# Patient Record
Sex: FEMALE | URBAN - METROPOLITAN AREA
[De-identification: names, ages, dates, MRNs, and addresses within clinical notes are randomized per-mention and may not be internally consistent; named-entity substitution may affect disease eponyms.]

---

## 2023-02-20 LAB
ALANINE AMINOTRANSFERASE (SGPT) (U/L) IN SER/PLAS: 34 U/L (ref 7–45)
ALBUMIN (G/DL) IN SER/PLAS: 4 G/DL (ref 3.4–5)
ALKALINE PHOSPHATASE (U/L) IN SER/PLAS: 43 U/L (ref 33–110)
ASPARTATE AMINOTRANSFERASE (SGOT) (U/L) IN SER/PLAS: 26 U/L (ref 9–39)
BILIRUBIN DIRECT (MG/DL) IN SER/PLAS: 0.1 MG/DL (ref 0–0.3)
BILIRUBIN TOTAL (MG/DL) IN SER/PLAS: 0.7 MG/DL (ref 0–1.2)
CHORIOGONADOTROPIN (MIU/ML) IN SER/PLAS: <2 MIU/ML
PROTEIN TOTAL: 7.5 G/DL (ref 6.4–8.2)
TRIGLYCERIDE (MG/DL) IN SER/PLAS: 140 MG/DL (ref 0–149)

## 2023-03-28 LAB — HCG, URINE: NEGATIVE

## 2023-04-25 LAB — HCG, URINE: NEGATIVE

## 2023-05-23 LAB — HCG, URINE: NEGATIVE

## 2023-06-22 LAB — HCG, URINE: NEGATIVE

## 2023-07-28 LAB — HCG, URINE: NEGATIVE

## 2024-06-11 ENCOUNTER — TELEPHONE (OUTPATIENT)
Dept: DERMATOLOGY | Facility: CLINIC | Age: 24
End: 2024-06-11

## 2024-06-11 NOTE — TELEPHONE ENCOUNTER
Patient called left message stating that she had put in a request for an appointment and had not heard back.  Patient states she is getting  next May and her acne is back and she would like to go back on Accutane.  Patient does need an appointment to be seen for further discussion with the provider.  Please contact patient and offer next available in person or virtual appointment whichever works best.

## 2024-06-27 ENCOUNTER — APPOINTMENT (OUTPATIENT)
Dept: DERMATOLOGY | Facility: CLINIC | Age: 24
End: 2024-06-27

## 2024-06-27 DIAGNOSIS — L70.0 ACNE VULGARIS: Primary | ICD-10-CM

## 2024-06-27 PROCEDURE — 99213 OFFICE O/P EST LOW 20 MIN: CPT | Performed by: NURSE PRACTITIONER

## 2024-06-27 RX ORDER — HYDROQUINONE 40 MG/G
CREAM TOPICAL 2 TIMES DAILY
Qty: 28.35 G | Refills: 0 | Status: SHIPPED | OUTPATIENT
Start: 2024-06-27 | End: 2025-06-27

## 2024-06-27 RX ORDER — ADAPALENE GEL USP, 0.3% 3 MG/G
GEL TOPICAL NIGHTLY
Qty: 45 G | Refills: 1 | Status: SHIPPED | OUTPATIENT
Start: 2024-06-27 | End: 2025-06-27

## 2024-06-27 NOTE — PROGRESS NOTES
Subjective     Kate Ruffin is a 23 y.o. female who presents for the following: Acne.   Established patient in today for acne follow-up patient had called and asked for a sooner appointment in regards to acne patient states she is getting  next May and was having acne flares she wanted to get under control.  Patient in today states that she is using gentle wash and a gentle moisturizer.  Asked patient if she is using any previously prescribed prescriptions patient states she uses tretinoin and spot treating if she has a breakout.    Patient has tried in the past: Differin gel, hydrating serums, oral antibiotic, clindamycin lotion, tretinoin, [place]ane [place], patient was on Accutane in 07/2023- cumulative dose: 12,000mg, benzyl peroxide wash      Review of Systems:  No other skin or systemic complaints other than what is documented elsewhere in the note.    The following portions of the chart were reviewed this encounter and updated as appropriate:  Allergies       Skin Cancer History  No skin cancer on file.    Specialty Problems    None    Past Medical History:  Kate Ruffin  has no past medical history on file.    Past Surgical History:  Kate Ruffin  has no past surgical history on file.    Family History:  Patient family history is not on file.    Social History:  Kate Ruffin  has no history on file for tobacco use, alcohol use, and drug use.    Allergies:  Patient has no known allergies.    Current Medications / CAM's:    Current Outpatient Medications:     adapalene (Differin) 0.3 % gel, Apply topically once daily at bedtime., Disp: 45 g, Rfl: 1    hydroquinone 4 % cream, Apply topically 2 times a day. For up to 3 months as needed until desired results achieved., Disp: 28.35 g, Rfl: 0     Objective   Well appearing patient in no apparent distress; mood and affect are within normal limits.      Assessment/Plan   1. Acne vulgaris  Head - Anterior (Face)  Clear on exam.  Patient has no evidence  active disease, but PIH on exam.     Maintenance of Current Regimen: Please continue using the prescribed topical medications as directed. Consistency is key to maintaining the improvement achieved so far.    PLAN:  Start adpalene 0.3%  Patient has been noncompliant with treatment in the past and has not been regularly using her previously prescribed tretinoin       Further Treatment Options: If there are no significant improvements or if your acne worsens, we may consider additional treatment options such as oral medications or in-office procedures. However, it is important to note that most cases of mild acne can be managed effectively with the current regimen.    Skin Care Tips: Continue following a gentle cleansing routine, avoiding harsh scrubbing, and using non-comedogenic moisturizers to keep your skin hydrated without clogging pores.    Cleansing Routine: Gentle Cleanser: You have been using a mild, non-comedogenic cleanser to wash your face twice daily. This practice helps to remove excess oil, dirt, and impurities from your skin, minimizing the risk of pore blockage.    Lifestyle Measures: Avoiding Trigger Factors: We also discussed avoiding known triggers such as excessive sun exposure, touching or picking at the acne lesions, and using heavy or comedogenic cosmetic products.    Please feel free to contact our clinic if you have any questions or concerns between appointments. Remember, acne treatment requires patience and consistency.                 hydroquinone 4 % cream - Head - Anterior (Face)  Apply topically 2 times a day. For up to 3 months as needed until desired results achieved.    adapalene (Differin) 0.3 % gel - Head - Anterior (Face)  Apply topically once daily at bedtime.

## 2024-10-03 ENCOUNTER — APPOINTMENT (OUTPATIENT)
Dept: DERMATOLOGY | Facility: CLINIC | Age: 24
End: 2024-10-03

## 2024-10-03 DIAGNOSIS — L70.0 ACNE VULGARIS: Primary | ICD-10-CM

## 2024-10-03 PROCEDURE — 99213 OFFICE O/P EST LOW 20 MIN: CPT | Performed by: NURSE PRACTITIONER

## 2024-10-03 NOTE — PROGRESS NOTES
Subjective     Kate Ruffin is a 24 y.o. female who presents for the following: Acne.   Established patient in for acne follow up last seen and prescribed adapalene 0.3%.     Patient states she has been travelling the last two weeks and has not been using the adapalene. She states that she is using Panoxyl wash occasionally, hyaluronic acid, salicylic acid wash and moisturizers.    Review of Systems:  No other skin or systemic complaints other than what is documented elsewhere in the note.    The following portions of the chart were reviewed this encounter and updated as appropriate:       Skin Cancer History  No skin cancer on file.    Specialty Problems    None    Past Medical History:  Kate Ruffin  has no past medical history on file.    Past Surgical History:  Kate Ruffin  has no past surgical history on file.    Family History:  Patient family history is not on file.    Social History:  Kate Ruffin  has no history on file for tobacco use, alcohol use, and drug use.    Allergies:  Patient has no known allergies.    Current Medications / CAM's:    Current Outpatient Medications:     adapalene (Differin) 0.3 % gel, Apply topically once daily at bedtime., Disp: 45 g, Rfl: 1    hydroquinone 4 % cream, Apply topically 2 times a day. For up to 3 months as needed until desired results achieved., Disp: 28.35 g, Rfl: 0     Objective   Well appearing patient in no apparent distress; mood and affect are within normal limits.      Assessment/Plan   1. Acne vulgaris  Head - Anterior (Face)  Clear on exam.  Patient has no evidence active disease, but PIH on exam.     PLAN:  Start adpalene 0.3%  Patient has been noncompliant with treatment in the past and has not been regularly using her previously prescribed tretinoin   Can use hydroquinone 4% cream twice daily for 2-3 months     Related Medications  hydroquinone 4 % cream  Apply topically 2 times a day. For up to 3 months as needed until desired results achieved.    adapalene  (Differin) 0.3 % gel  Apply topically once daily at bedtime.

## 2024-10-26 ENCOUNTER — OFFICE VISIT (OUTPATIENT)
Dept: URGENT CARE | Age: 24
End: 2024-10-26

## 2024-10-26 VITALS
WEIGHT: 135 LBS | OXYGEN SATURATION: 98 % | HEIGHT: 66 IN | DIASTOLIC BLOOD PRESSURE: 87 MMHG | TEMPERATURE: 98.4 F | SYSTOLIC BLOOD PRESSURE: 141 MMHG | RESPIRATION RATE: 16 BRPM | BODY MASS INDEX: 21.69 KG/M2 | HEART RATE: 92 BPM

## 2024-10-26 DIAGNOSIS — M54.50 ACUTE LEFT-SIDED LOW BACK PAIN WITHOUT SCIATICA: Primary | ICD-10-CM

## 2024-10-26 LAB
POC APPEARANCE, URINE: ABNORMAL
POC BILIRUBIN, URINE: NEGATIVE
POC BLOOD, URINE: NEGATIVE
POC COLOR, URINE: YELLOW
POC GLUCOSE, URINE: NEGATIVE MG/DL
POC KETONES, URINE: NEGATIVE MG/DL
POC LEUKOCYTES, URINE: NEGATIVE
POC NITRITE,URINE: NEGATIVE
POC PH, URINE: 6 PH
POC PROTEIN, URINE: NEGATIVE MG/DL
POC SPECIFIC GRAVITY, URINE: 1.01
POC UROBILINOGEN, URINE: 0.2 EU/DL

## 2024-10-26 PROCEDURE — 87086 URINE CULTURE/COLONY COUNT: CPT

## 2024-10-26 RX ORDER — NAPROXEN 500 MG/1
500 TABLET ORAL 2 TIMES DAILY PRN
Qty: 14 TABLET | Refills: 0 | Status: SHIPPED | OUTPATIENT
Start: 2024-10-26 | End: 2024-11-02

## 2024-10-26 NOTE — PROGRESS NOTES
"Subjective   Patient ID: Kate Ruffin is a 24 y.o. female. They present today with a chief complaint of Back Pain and UTI (Going on x1 day. ).    History of Present Illness  Patient reports symptoms have been present for ~1 day  Patient reports pain is worse with palpation and movement  Wonders if she hurt her back while bending over yesterday  Denies dysuria, increased urinary freq, urgency, hematuria, fever, chills, nausea/vomiting  Denies pelvic pain  Denies vaginal d/c or pruritis   Reports hx of UTI when she was a child which required hospitalization  Patient believes that she drinks a decent amount of water      Past Medical History  Allergies as of 10/26/2024    (No Known Allergies)       (Not in a hospital admission)       No past medical history on file.    No past surgical history on file.     reports that she has never smoked. She has never used smokeless tobacco.                               Objective    Vitals:    10/26/24 1908   BP: 141/87   BP Location: Right arm   Patient Position: Sitting   Pulse: 92   Resp: 16   Temp: 36.9 °C (98.4 °F)   TempSrc: Oral   SpO2: 98%   Weight: 61.2 kg (135 lb)   Height: 1.676 m (5' 6\")     Patient's last menstrual period was 10/22/2024 (approximate).    Physical Exam  Constitutional:       General: She is not in acute distress.     Appearance: Normal appearance. She is not toxic-appearing or diaphoretic.   HENT:      Nose: No rhinorrhea.   Eyes:      General: No scleral icterus.        Right eye: No discharge.         Left eye: No discharge.      Extraocular Movements: Extraocular movements intact.   Pulmonary:      Effort: Pulmonary effort is normal.   Abdominal:      Tenderness: There is no right CVA tenderness or left CVA tenderness.   Musculoskeletal:      Cervical back: Normal range of motion.      Lumbar back: Tenderness present. Normal range of motion.      Comments: Left lower back  Pain with flexion of back   Neurological:      Mental Status: She is alert. "   Psychiatric:         Mood and Affect: Mood normal.         Behavior: Behavior normal.         Thought Content: Thought content normal.      Comments: Pleasant         Procedures    Point of Care Test & Imaging Results from this visit:  Results for orders placed or performed in visit on 10/26/24   POCT UA Automated manually resulted    Collection Time: 10/26/24  7:15 PM   Result Value Ref Range    POC Color, Urine Yellow Straw, Yellow, Light-Yellow    POC Appearance, Urine Hazy (A) Clear    POC Glucose, Urine NEGATIVE NEGATIVE mg/dl    POC Bilirubin, Urine NEGATIVE NEGATIVE    POC Ketones, Urine NEGATIVE NEGATIVE mg/dl    POC Specific Gravity, Urine 1.015 1.005 - 1.035    POC Blood, Urine NEGATIVE NEGATIVE    POC PH, Urine 6.0 No Reference Range Established PH    POC Protein, Urine NEGATIVE NEGATIVE, 30 (1+) mg/dl    POC Urobilinogen, Urine 0.2 0.2, 1.0 EU/DL    Poc Nitrite, Urine NEGATIVE NEGATIVE    POC Leukocytes, Urine NEGATIVE NEGATIVE       Diagnostic study results (if any) were reviewed by Sabino Whyte MD.    Assessment/Plan   Allergies, medications, history, and pertinent labs/EKGs/Imaging reviewed by Sabino Whyte MD.     Medical Decision Making:    Patient's atraumatic nonradicular back pain is likely 2/2 muscle strain vs less likely muscle spasm, vs other etiology. Conservative management with NSAIDs PRN, ICE/heat, and lidocaine/menthol patches. UA unremarkable, not suggestive of UTI. Will send urine culture for completeness. Return as needed. ER if symptoms worsen      Orders and Diagnoses  Diagnoses and all orders for this visit:  Acute left-sided low back pain without sciatica  -     POCT UA Automated manually resulted  -     Urine Culture; Future  -     naproxen (Naprosyn) 500 mg tablet; Take 1 tablet (500 mg) by mouth 2 times a day as needed (back pain) for up to 7 days.      Patient disposition: Home      Medical Admin Record      Follow Up Instructions  No follow-ups on  file.    Electronically signed by Sabino Whyte MD  7:54 PM

## 2024-10-28 LAB — BACTERIA UR CULT: NORMAL

## 2025-10-01 ENCOUNTER — APPOINTMENT (OUTPATIENT)
Dept: DERMATOLOGY | Facility: CLINIC | Age: 25
End: 2025-10-01